# Patient Record
Sex: FEMALE | Race: ASIAN | NOT HISPANIC OR LATINO | Employment: UNEMPLOYED | URBAN - METROPOLITAN AREA
[De-identification: names, ages, dates, MRNs, and addresses within clinical notes are randomized per-mention and may not be internally consistent; named-entity substitution may affect disease eponyms.]

---

## 2021-03-02 ENCOUNTER — OFFICE VISIT (OUTPATIENT)
Dept: OBGYN CLINIC | Facility: CLINIC | Age: 36
End: 2021-03-02

## 2021-03-02 VITALS
HEIGHT: 60 IN | SYSTOLIC BLOOD PRESSURE: 116 MMHG | BODY MASS INDEX: 30.15 KG/M2 | WEIGHT: 153.6 LBS | DIASTOLIC BLOOD PRESSURE: 77 MMHG | HEART RATE: 80 BPM

## 2021-03-02 DIAGNOSIS — Z12.39 ENCOUNTER FOR BREAST CANCER SCREENING USING NON-MAMMOGRAM MODALITY: ICD-10-CM

## 2021-03-02 DIAGNOSIS — Z01.419 ENCOUNTER FOR GYNECOLOGICAL EXAMINATION WITHOUT ABNORMAL FINDING: Primary | ICD-10-CM

## 2021-03-02 DIAGNOSIS — Z12.4 SCREENING FOR CERVICAL CANCER: ICD-10-CM

## 2021-03-02 PROCEDURE — G0145 SCR C/V CYTO,THINLAYER,RESCR: HCPCS | Performed by: NURSE PRACTITIONER

## 2021-03-02 PROCEDURE — 87624 HPV HI-RISK TYP POOLED RSLT: CPT | Performed by: NURSE PRACTITIONER

## 2021-03-02 PROCEDURE — 99385 PREV VISIT NEW AGE 18-39: CPT | Performed by: NURSE PRACTITIONER

## 2021-03-02 NOTE — PATIENT INSTRUCTIONS
OBESITY     Obesity is defined as a body mass index (BMI) which is greater than 30  Your Body mass index is 30 kg/m²       The risks of obesity include  many health problems, such as injuries or physical disability  You may need tests to check for the following:  · Diabetes     · High blood pressure or high cholesterol     · Heart disease     · Gallbladder or liver disease     · Cancer of the colon, breast, prostate, liver, or kidney     · Sleep apnea     · Arthritis or gout    Seek care immediately if:   · You have a severe headache, confusion, or difficulty speaking  · You have weakness on one side of your body  · You have chest pain, sweating, or shortness of breath  Contact your healthcare provider if:   · You have symptoms of gallbladder or liver disease, such as pain in your upper abdomen  · You have knee or hip pain and discomfort while walking  · You have symptoms of diabetes, such as intense hunger and thirst, and frequent urination  · You have symptoms of sleep apnea, such as snoring or daytime sleepiness  · You have questions or concerns about your condition or care  Treatment for obesity  focuses on helping you lose weight to improve your health  Even a small decrease in BMI can reduce the risk for many health problems  Your healthcare provider will help you set a weight-loss goal   · Lifestyle changes  are the first step in treating obesity  These include making healthy food choices and getting regular physical activity  Your healthcare provider may suggest a weight-loss program that involves coaching, education, and therapy  · Medicine  may help you lose weight when it is used with a healthy diet and physical activity  · Surgery  can help you lose weight if you are very obese and have other health problems  There are several types of weight-loss surgery  Ask your healthcare provider for more information  Be successful losing weight:   · Set small, realistic goals  An example of a small goal is to walk for 20 minutes 5 days a week  Dale goal is to lose 5% of your body weight  · Tell friends, family members, and coworkers about your goals  and ask for their support  Ask a friend to lose weight with you, or join a weight-loss support group  · Identify foods or triggers that may cause you to overeat , and find ways to avoid them  Remove tempting high-calorie foods from your home and workplace  Place a bowl of fresh fruit on your kitchen counter  If stress causes you to eat, then find other ways to cope with stress  · Keep a diary to track what you eat and drink  Also write down how many minutes of physical activity you do each day  Weigh yourself once a week and record it in your diary  Eating changes: You will need to eat 500 to 1,000 fewer calories each day than you currently eat to lose 1 to 2 pounds a week  The following changes will help you cut calories:  · Eat smaller portions  Use small plates, no larger than 9 inches in diameter  Fill your plate half full of fruits and vegetables  Measure your food using measuring cups until you know what a serving size looks like  · Eat 3 meals and 1 or 2 snacks each day  Plan your meals in advance  Katheryn Marcus and eat at home most of the time  Eat slowly  · Eat fruits and vegetables at every meal   They are low in calories and high in fiber, which makes you feel full  Do not add butter, margarine, or cream sauce to vegetables  Use herbs to season steamed vegetables  · Eat less fat and fewer fried foods  Eat more baked or grilled chicken and fish  These protein sources are lower in calories and fat than red meat  Limit fast food  Dress your salads with olive oil and vinegar instead of bottled dressing  · Limit the amount of sugar you eat  Do not drink sugary beverages  Limit alcohol  Activity changes:  Physical activity is good for your body in many ways   It helps you burn calories and build strong muscles  It decreases stress and depression, and improves your mood  It can also help you sleep better  Talk to your healthcare provider before you begin an exercise program   · Exercise for at least 30 minutes 5 days a week  Start slowly  Set aside time each day for physical activity that you enjoy and that is convenient for you  It is best to do both weight training and an activity that increases your heart rate, such as walking, bicycling, or swimming  · Find ways to be more active  Do yard work and housecleaning  Walk up the stairs instead of using elevators  Spend your leisure time going to events that require walking, such as outdoor festivals or fairs  This extra physical activity can help you lose weight and keep it off  Follow up with your primary healthcare provider as directed  You may need to meet with a dietitian  Write down your questions so you remember to ask them during your visits

## 2021-03-02 NOTE — LETTER
3/8/2021    To Jb MOORE: 1985      This letter is to advise you that your recent PAP SMEAR results were reviewed by me and are NORMAL    We will see you in 1 year for your annual exam     Elna Meigs, CRNP

## 2021-03-02 NOTE — PROGRESS NOTES
ANNUAL GYNECOLOGICAL EXAMINATION    Ashutosh Sparks is a 43 y o  female who presents today for annual GYN exam   Her last pap smear was performed 2019 and result was abnormal per patient (HPV+), but she also reports colposcopy was performed and WNL  She reports menses as regular  Patient's last menstrual period was 2021  Her contraceptive method is nothing  Her general medical history has been reviewed and she reports it as follows:    Past Medical History:   Diagnosis Date    HPV in female 2019 colpo WNL     Past Surgical History:   Procedure Laterality Date     SECTION  2019     OB History        3    Para   2    Term   2       0    AB   1    Living   2       SAB   1    TAB   0    Ectopic   0    Multiple   0    Live Births   2               Social History     Tobacco Use    Smoking status: Former Smoker     Start date: 3/2/2002     Quit date: 2014     Years since quittin 5    Smokeless tobacco: Never Used   Substance Use Topics    Alcohol use: Yes     Frequency: Monthly or less     Drinks per session: 3 or 4    Drug use: Yes     Types: Marijuana     Cancer-related family history is not on file  She was adopted  No current outpatient medications    Review of Systems:  Review of Systems   Constitutional: Negative  Gastrointestinal: Negative  Genitourinary: Negative for difficulty urinating, menstrual problem, pelvic pain and vaginal discharge  Skin: Negative  Physical Exam:  /77   Pulse 80   Ht 5' (1 524 m)   Wt 69 7 kg (153 lb 9 6 oz)   LMP 2021   BMI 30 00 kg/m²   Physical Exam  Constitutional:       General: She is not in acute distress  Appearance: She is well-developed  Genitourinary:      Vulva, vagina and uterus normal       No lesions in the vagina  No cervical motion tenderness or lesion  Uterus is not tender  No right or left adnexal mass present  Right adnexa not tender        Left adnexa not tender  Neck:      Musculoskeletal: Neck supple  Thyroid: No thyromegaly  Cardiovascular:      Rate and Rhythm: Normal rate and regular rhythm  Pulmonary:      Effort: Pulmonary effort is normal    Chest:      Breasts:         Right: No mass, nipple discharge, skin change or tenderness  Left: No mass, nipple discharge, skin change or tenderness  Abdominal:      Palpations: Abdomen is soft  Tenderness: There is no abdominal tenderness  Neurological:      Mental Status: She is alert and oriented to person, place, and time  Skin:     General: Skin is warm and dry  Vitals signs reviewed  Assessment/Plan:   1  Normal well-woman GYN exam   2  Cervical cancer screening:  Normal pelvic exam   Pap smear done with HPV co-testing  3  STD screening:  Patient declines  4  Breast cancer screening:  Normal breast exam   Reviewed breast self-awareness  5  Depression Screening: Patient's depression screening was assessed with a PHQ-2 score of 0  Clinically patient does not have depression  No treatment is required  6  BMI Counseling: Body mass index is 30 kg/m²  Discussed the patient's BMI with her  The BMI is above normal  Patient referred to PCP due to patient being obese  7  Contraception:  Declines     8  Return to office in 1 year for annual GYN exam

## 2021-03-03 LAB
HPV HR 12 DNA CVX QL NAA+PROBE: NEGATIVE
HPV16 DNA CVX QL NAA+PROBE: NEGATIVE
HPV18 DNA CVX QL NAA+PROBE: NEGATIVE

## 2021-03-08 LAB
LAB AP GYN PRIMARY INTERPRETATION: NORMAL
Lab: NORMAL

## 2022-03-03 ENCOUNTER — ANNUAL EXAM (OUTPATIENT)
Dept: OBGYN CLINIC | Facility: CLINIC | Age: 37
End: 2022-03-03

## 2022-03-03 VITALS
SYSTOLIC BLOOD PRESSURE: 129 MMHG | WEIGHT: 138.8 LBS | BODY MASS INDEX: 27.11 KG/M2 | HEART RATE: 81 BPM | DIASTOLIC BLOOD PRESSURE: 84 MMHG

## 2022-03-03 DIAGNOSIS — Z12.39 ENCOUNTER FOR BREAST CANCER SCREENING USING NON-MAMMOGRAM MODALITY: ICD-10-CM

## 2022-03-03 DIAGNOSIS — Z01.419 ENCOUNTER FOR GYNECOLOGICAL EXAMINATION WITHOUT ABNORMAL FINDING: Primary | ICD-10-CM

## 2022-03-03 DIAGNOSIS — Z12.4 SCREENING FOR CERVICAL CANCER: ICD-10-CM

## 2022-03-03 PROCEDURE — 99395 PREV VISIT EST AGE 18-39: CPT | Performed by: NURSE PRACTITIONER

## 2022-03-03 PROCEDURE — 0503F POSTPARTUM CARE VISIT: CPT | Performed by: NURSE PRACTITIONER

## 2022-03-03 NOTE — PROGRESS NOTES
ANNUAL GYNECOLOGICAL EXAMINATION    Jose Alberto Tavarez is a 42 y o  female who presents today for annual GYN exam   Her last pap smear was performed 3/2/2021 and result was NILM with negative HPV  She reports menses as regular  Patient's last menstrual period was 02/15/2022 (exact date)  Her contraceptive method is nothing  Her general medical history has been reviewed and she reports it as follows:    Past Medical History:   Diagnosis Date    Abnormal Pap smear of cervix     2019 per patient normal pap/+ HPV, colpo WNL; 3/2021 pap NILM/neg HPV     Past Surgical History:   Procedure Laterality Date     SECTION  2019     OB History        3    Para   2    Term   2       0    AB   1    Living   2       SAB   1    IAB   0    Ectopic   0    Multiple   0    Live Births   2               Social History     Tobacco Use    Smoking status: Former Smoker     Types: Cigarettes     Quit date:      Years since quittin 1    Smokeless tobacco: Never Used   Vaping Use    Vaping Use: Never used   Substance Use Topics    Alcohol use: Yes    Drug use: Not Currently     Types: Marijuana     Cancer-related family history is not on file  She was adopted  No current outpatient medications    Review of Systems:  Review of Systems   Constitutional: Negative  Gastrointestinal: Negative  Genitourinary: Negative for difficulty urinating, menstrual problem, pelvic pain and vaginal discharge  Skin: Negative  Physical Exam:  /84   Pulse 81   Wt 63 kg (138 lb 12 8 oz)   LMP 02/15/2022 (Exact Date)   BMI 27 11 kg/m²   Physical Exam  Constitutional:       General: She is not in acute distress  Appearance: She is well-developed  Genitourinary:      Vulva normal       No lesions in the vagina  Right Adnexa: not tender and no mass present  Left Adnexa: not tender and no mass present  No cervical motion tenderness or lesion  Uterus is not tender     Breasts: Right: No mass, nipple discharge, skin change or tenderness  Left: No mass, nipple discharge, skin change or tenderness  Neck:      Thyroid: No thyromegaly  Cardiovascular:      Rate and Rhythm: Normal rate and regular rhythm  Pulmonary:      Effort: Pulmonary effort is normal    Abdominal:      Palpations: Abdomen is soft  Tenderness: There is no abdominal tenderness  Musculoskeletal:      Cervical back: Neck supple  Neurological:      Mental Status: She is alert and oriented to person, place, and time  Skin:     General: Skin is warm and dry  Vitals reviewed  Assessment/Plan:   1  Normal well-woman GYN exam   2  Cervical cancer screening:  Normal cervical exam   Pap smear not indicated at this time  3  STD screening:  Patient declines  4  Breast cancer screening:  Normal breast exam   Reviewed breast self-awareness  5  Depression Screening: Patient's depression screening was assessed with a PHQ-2 score of 0  Their PHQ-9 score was 1  Clinically patient does not have depression  No treatment is required  6  BMI Counseling: Body mass index is 27 11 kg/m²  No intervention indicated  7  Contraception:  Declines     8  Return to office in 1 year for annual GYN exam

## 2023-03-16 ENCOUNTER — ANNUAL EXAM (OUTPATIENT)
Dept: OBGYN CLINIC | Facility: CLINIC | Age: 38
End: 2023-03-16

## 2023-03-16 VITALS
BODY MASS INDEX: 29.49 KG/M2 | DIASTOLIC BLOOD PRESSURE: 70 MMHG | SYSTOLIC BLOOD PRESSURE: 129 MMHG | HEIGHT: 60 IN | WEIGHT: 150.2 LBS | HEART RATE: 71 BPM

## 2023-03-16 DIAGNOSIS — Z01.419 ENCOUNTER FOR GYNECOLOGICAL EXAMINATION WITHOUT ABNORMAL FINDING: Primary | ICD-10-CM

## 2023-03-16 DIAGNOSIS — Z12.39 ENCOUNTER FOR BREAST CANCER SCREENING USING NON-MAMMOGRAM MODALITY: ICD-10-CM

## 2023-03-16 DIAGNOSIS — Z12.4 SCREENING FOR CERVICAL CANCER: ICD-10-CM

## 2023-03-16 RX ORDER — MELATONIN
1000 DAILY
COMMUNITY

## 2023-03-16 RX ORDER — DIPHENHYDRAMINE HYDROCHLORIDE 25 MG/1
TABLET ORAL
COMMUNITY

## 2023-03-16 RX ORDER — CHLORAL HYDRATE 500 MG
CAPSULE ORAL
COMMUNITY

## 2023-03-16 RX ORDER — DIPHENOXYLATE HYDROCHLORIDE AND ATROPINE SULFATE 2.5; .025 MG/1; MG/1
1 TABLET ORAL DAILY
COMMUNITY

## 2023-03-16 NOTE — PROGRESS NOTES
ANNUAL GYNECOLOGICAL EXAMINATION    Minoo Boy is a 34 y o  female who presents today for annual GYN exam   Her last pap smear was performed 3/2/2021 and result was NILM with negative HPV  She reports menses as regular  Patient's last menstrual period was 2023  Her general medical history has been reviewed and she reports it as follows:    Past Medical History:   Diagnosis Date   • Abnormal Pap smear of cervix     2019 per patient normal pap/+ HPV, colpo WNL; 3/2021 pap NILM/neg HPV     Past Surgical History:   Procedure Laterality Date   •  SECTION  2019   • TOOTH EXTRACTION       OB History        3    Para   2    Term   2       0    AB   1    Living   2       SAB   1    IAB   0    Ectopic   0    Multiple   0    Live Births   2               Social History     Tobacco Use   • Smoking status: Former     Types: Cigarettes     Quit date:      Years since quittin 2   • Smokeless tobacco: Never   Vaping Use   • Vaping Use: Never used   Substance Use Topics   • Alcohol use: Yes     Comment: occ wine   • Drug use: Yes     Types: Marijuana     Comment: daily     Social History     Substance and Sexual Activity   Sexual Activity Yes   • Partners: Male   • Birth control/protection: Coitus interruptus     Cancer-related family history is not on file  She was adopted  Current Outpatient Medications   Medication Instructions   • Biotin (Biotin 5000) 5 MG CAPS Oral   • cholecalciferol (VITAMIN D3) 1,000 Units, Oral, Daily   • cyanocobalamin (VITAMIN B-12) 100 mcg, Oral, Daily   • FERROUS SULFATE PO Oral   • multivitamin (THERAGRAN) TABS 1 tablet, Oral, Daily   • Omega-3 1000 MG CAPS Oral   • PREBIOTIC PRODUCT PO Oral   • Probiotic Product (PROBIOTIC DAILY PO) Oral       Review of Systems:  Review of Systems   Constitutional: Negative  Gastrointestinal: Negative  Genitourinary: Negative for difficulty urinating, menstrual problem, pelvic pain and vaginal discharge     Skin: Negative  Physical Exam:  /70   Pulse 71   Ht 5' (1 524 m)   Wt 68 1 kg (150 lb 3 2 oz)   LMP 03/02/2023   BMI 29 33 kg/m²   Physical Exam  Constitutional:       General: She is not in acute distress  Appearance: She is well-developed  Genitourinary:      Vulva normal       No lesions in the vagina  Right Adnexa: not tender and no mass present  Left Adnexa: not tender and no mass present  No cervical motion tenderness or lesion  Uterus is not tender  Breasts:     Right: No mass, nipple discharge, skin change or tenderness  Left: No mass, nipple discharge, skin change or tenderness  Neck:      Thyroid: No thyromegaly  Cardiovascular:      Rate and Rhythm: Normal rate and regular rhythm  Pulmonary:      Effort: Pulmonary effort is normal    Abdominal:      Palpations: Abdomen is soft  Tenderness: There is no abdominal tenderness  Musculoskeletal:      Cervical back: Neck supple  Neurological:      Mental Status: She is alert and oriented to person, place, and time  Skin:     General: Skin is warm and dry  Vitals reviewed  Assessment/Plan:   1  Normal well-woman GYN exam   2  Cervical cancer screening:  Normal cervical exam   Pap smear not indicated at this time  3  STD screening:  Patient declines  4  Breast cancer screening:  Normal breast exam   Reviewed breast self-awareness  5  Depression Screening: Patient's depression screening was assessed with a PHQ-2 score of 0  Their PHQ-9 score was 2  Clinically patient does not have depression  No treatment is required  6  BMI Counseling: Body mass index is 29 33 kg/m²  No intervention indicated  7  Contraception:  Declines  8  Return to office in 1 year for annual GYN exam     Reviewed with patient that test results are available in [address] immediately, but that they will not necessarily be reviewed by me immediately    Explained that I will review results at my earliest opportunity and contact patient appropriately

## 2023-03-16 NOTE — PATIENT INSTRUCTIONS
Thank you for your confidence in our team    We appreciate you and welcome your feedback  If you receive a survey from us, please take a few moments to let us know how we are doing     Sincerely,  Maggie Perry